# Patient Record
Sex: FEMALE | URBAN - NONMETROPOLITAN AREA
[De-identification: names, ages, dates, MRNs, and addresses within clinical notes are randomized per-mention and may not be internally consistent; named-entity substitution may affect disease eponyms.]

---

## 2024-05-22 ENCOUNTER — APPOINTMENT (OUTPATIENT)
Dept: URBAN - NONMETROPOLITAN AREA CLINIC 47 | Age: 74
Setting detail: DERMATOLOGY
End: 2024-05-27

## 2024-05-22 DIAGNOSIS — L30.9 DERMATITIS, UNSPECIFIED: ICD-10-CM

## 2024-05-22 PROCEDURE — OTHER REASSURANCE: OTHER

## 2024-05-22 PROCEDURE — OTHER COUNSELING: OTHER

## 2024-05-22 PROCEDURE — 99202 OFFICE O/P NEW SF 15 MIN: CPT

## 2024-05-22 ASSESSMENT — LOCATION DETAILED DESCRIPTION DERM: LOCATION DETAILED: LEFT MEDIAL POSTERIOR ANKLE

## 2024-05-22 ASSESSMENT — LOCATION ZONE DERM: LOCATION ZONE: LEG

## 2024-05-22 ASSESSMENT — LOCATION SIMPLE DESCRIPTION DERM: LOCATION SIMPLE: LEFT ANKLE

## 2024-05-22 NOTE — PROCEDURE: COUNSELING
Detail Level: Detailed
Patient Specific Counseling (Will Not Stick From Patient To Patient): Recommended compression socks 15-20mmhg knee high (sockdoc given to pt).

## 2024-05-22 NOTE — HPI: SKIN LESION
Is This A New Presentation, Or A Follow-Up?: Skin Lesion
Additional History: She was dx with cellulitis in Feb and put on abx (Bactrim) no improvements then doxycycline (2-3weeks)/injections. She did a Hx and was inclusive.